# Patient Record
Sex: FEMALE | Race: WHITE | NOT HISPANIC OR LATINO | Employment: OTHER | URBAN - METROPOLITAN AREA
[De-identification: names, ages, dates, MRNs, and addresses within clinical notes are randomized per-mention and may not be internally consistent; named-entity substitution may affect disease eponyms.]

---

## 2017-03-31 ENCOUNTER — NEW REFERRAL (OUTPATIENT)
Dept: URBAN - METROPOLITAN AREA CLINIC 33 | Facility: CLINIC | Age: 77
End: 2017-03-31

## 2017-03-31 VITALS
SYSTOLIC BLOOD PRESSURE: 159 MMHG | HEART RATE: 80 BPM | BODY MASS INDEX: 27.82 KG/M2 | HEIGHT: 65 IN | DIASTOLIC BLOOD PRESSURE: 86 MMHG | WEIGHT: 167 LBS

## 2017-03-31 DIAGNOSIS — H02.833: ICD-10-CM

## 2017-03-31 DIAGNOSIS — Z98.890: ICD-10-CM

## 2017-03-31 DIAGNOSIS — H43.391: ICD-10-CM

## 2017-03-31 DIAGNOSIS — H35.373: ICD-10-CM

## 2017-03-31 DIAGNOSIS — H02.836: ICD-10-CM

## 2017-03-31 DIAGNOSIS — H04.123: ICD-10-CM

## 2017-03-31 PROCEDURE — 92134 CPTRZ OPH DX IMG PST SGM RTA: CPT

## 2017-03-31 PROCEDURE — 99204 OFFICE O/P NEW MOD 45 MIN: CPT

## 2017-03-31 PROCEDURE — 92225 OPHTHALMOSCOPY (INITIAL): CPT

## 2017-03-31 ASSESSMENT — VISUAL ACUITY
OS_SC: 20/50+2
OD_SC: 20/25

## 2017-03-31 ASSESSMENT — TONOMETRY
OD_IOP_MMHG: 15
OS_IOP_MMHG: 15

## 2017-10-27 ENCOUNTER — FOLLOW UP (OUTPATIENT)
Dept: URBAN - METROPOLITAN AREA CLINIC 33 | Facility: CLINIC | Age: 77
End: 2017-10-27

## 2017-10-27 VITALS — DIASTOLIC BLOOD PRESSURE: 82 MMHG | SYSTOLIC BLOOD PRESSURE: 159 MMHG | HEART RATE: 81 BPM | HEIGHT: 55 IN

## 2017-10-27 DIAGNOSIS — H35.373: ICD-10-CM

## 2017-10-27 DIAGNOSIS — H43.391: ICD-10-CM

## 2017-10-27 PROCEDURE — G8427 DOCREV CUR MEDS BY ELIG CLIN: HCPCS

## 2017-10-27 PROCEDURE — 92014 COMPRE OPH EXAM EST PT 1/>: CPT

## 2017-10-27 PROCEDURE — 92250 FUNDUS PHOTOGRAPHY W/I&R: CPT

## 2017-10-27 PROCEDURE — 1036F TOBACCO NON-USER: CPT

## 2017-10-27 ASSESSMENT — VISUAL ACUITY
OS_SC: 20/50+2
OD_SC: 20/30-2

## 2017-10-27 ASSESSMENT — TONOMETRY
OS_IOP_MMHG: 15
OD_IOP_MMHG: 17

## 2018-11-09 ENCOUNTER — FOLLOW UP (OUTPATIENT)
Dept: URBAN - METROPOLITAN AREA CLINIC 33 | Facility: CLINIC | Age: 78
End: 2018-11-09

## 2018-11-09 VITALS — BODY MASS INDEX: 28.13 KG/M2 | HEIGHT: 66 IN | WEIGHT: 175 LBS

## 2018-11-09 DIAGNOSIS — H35.373: ICD-10-CM

## 2018-11-09 DIAGNOSIS — H02.836: ICD-10-CM

## 2018-11-09 DIAGNOSIS — H43.391: ICD-10-CM

## 2018-11-09 DIAGNOSIS — H35.3131: ICD-10-CM

## 2018-11-09 DIAGNOSIS — Z98.890: ICD-10-CM

## 2018-11-09 DIAGNOSIS — H02.833: ICD-10-CM

## 2018-11-09 DIAGNOSIS — H04.123: ICD-10-CM

## 2018-11-09 PROCEDURE — 92250 FUNDUS PHOTOGRAPHY W/I&R: CPT

## 2018-11-09 PROCEDURE — 92134 CPTRZ OPH DX IMG PST SGM RTA: CPT

## 2018-11-09 PROCEDURE — 92014 COMPRE OPH EXAM EST PT 1/>: CPT

## 2018-11-09 ASSESSMENT — TONOMETRY
OD_IOP_MMHG: 14
OS_IOP_MMHG: 17

## 2018-11-09 ASSESSMENT — VISUAL ACUITY
OD_SC: 20/30-
OS_SC: 20/40-1

## 2019-12-20 ENCOUNTER — FOLLOW UP (OUTPATIENT)
Dept: URBAN - METROPOLITAN AREA CLINIC 33 | Facility: CLINIC | Age: 79
End: 2019-12-20

## 2019-12-20 VITALS — BODY MASS INDEX: 27.16 KG/M2 | HEIGHT: 65 IN | WEIGHT: 163 LBS

## 2019-12-20 DIAGNOSIS — H02.833: ICD-10-CM

## 2019-12-20 DIAGNOSIS — H35.373: ICD-10-CM

## 2019-12-20 DIAGNOSIS — Z98.890: ICD-10-CM

## 2019-12-20 DIAGNOSIS — H43.391: ICD-10-CM

## 2019-12-20 DIAGNOSIS — H04.123: ICD-10-CM

## 2019-12-20 DIAGNOSIS — H35.3131: ICD-10-CM

## 2019-12-20 DIAGNOSIS — H02.836: ICD-10-CM

## 2019-12-20 PROCEDURE — 92250 FUNDUS PHOTOGRAPHY W/I&R: CPT

## 2019-12-20 PROCEDURE — 92014 COMPRE OPH EXAM EST PT 1/>: CPT

## 2019-12-20 ASSESSMENT — VISUAL ACUITY
OS_SC: 20/50-
OD_SC: 20/30-

## 2019-12-20 ASSESSMENT — TONOMETRY
OS_IOP_MMHG: 14
OD_IOP_MMHG: 13

## 2020-12-11 ENCOUNTER — FOLLOW UP (OUTPATIENT)
Dept: URBAN - METROPOLITAN AREA CLINIC 33 | Facility: CLINIC | Age: 80
End: 2020-12-11

## 2020-12-11 VITALS — WEIGHT: 166 LBS | BODY MASS INDEX: 29.41 KG/M2 | HEIGHT: 63 IN

## 2020-12-11 DIAGNOSIS — H04.123: ICD-10-CM

## 2020-12-11 DIAGNOSIS — H35.373: ICD-10-CM

## 2020-12-11 DIAGNOSIS — H43.391: ICD-10-CM

## 2020-12-11 DIAGNOSIS — H02.833: ICD-10-CM

## 2020-12-11 DIAGNOSIS — H02.836: ICD-10-CM

## 2020-12-11 DIAGNOSIS — Z98.890: ICD-10-CM

## 2020-12-11 DIAGNOSIS — H35.3131: ICD-10-CM

## 2020-12-11 PROCEDURE — 92014 COMPRE OPH EXAM EST PT 1/>: CPT

## 2020-12-11 PROCEDURE — 92250 FUNDUS PHOTOGRAPHY W/I&R: CPT

## 2020-12-11 PROCEDURE — 92134 CPTRZ OPH DX IMG PST SGM RTA: CPT

## 2020-12-11 ASSESSMENT — VISUAL ACUITY
OD_SC: 20/40-2
OS_SC: 20/60-2

## 2020-12-11 ASSESSMENT — TONOMETRY
OD_IOP_MMHG: 14
OS_IOP_MMHG: 15

## 2022-05-05 ENCOUNTER — FOLLOW UP (OUTPATIENT)
Dept: URBAN - METROPOLITAN AREA CLINIC 33 | Facility: CLINIC | Age: 82
End: 2022-05-05

## 2022-05-05 DIAGNOSIS — H43.391: ICD-10-CM

## 2022-05-05 DIAGNOSIS — H04.123: ICD-10-CM

## 2022-05-05 DIAGNOSIS — H35.3131: ICD-10-CM

## 2022-05-05 DIAGNOSIS — H35.373: ICD-10-CM

## 2022-05-05 PROCEDURE — 92134 CPTRZ OPH DX IMG PST SGM RTA: CPT

## 2022-05-05 PROCEDURE — 92014 COMPRE OPH EXAM EST PT 1/>: CPT

## 2022-05-05 ASSESSMENT — TONOMETRY
OD_IOP_MMHG: 14
OS_IOP_MMHG: 14

## 2022-05-05 ASSESSMENT — VISUAL ACUITY
OD_SC: 20/25-1
OS_PH: 20/30+1
OS_SC: 20/50-1

## 2022-10-17 NOTE — PROCEDURE NOTE: CLINICAL
PROCEDURE NOTE: YAG Capsulotomy OS. Diagnosis: Posterior Capsular Opacity. Anesthesia: Topical. Prior to commencing surgery patient identification, surgical procedure, site, and side were confirmed by Dr. Rosio Cooley. Following topical proparacaine anesthesia, the patient was positioned at the YAG laser, a contact lens coupled to the cornea with methylcellulose and an axial posterior performed capsulotomy without complication using 2.8 Mj x 28. Excess methylcellulose was washed from the eye. One drop of Alphagan was instilled and the patient returned to the holding area having tolerated the procedure well and without complication. Alix Tolentino

## 2022-10-17 NOTE — PATIENT DISCUSSION
Posterior capsular opacity accounts for the patient's complaints and is interfering with activities of daily living. Discussed risks and benefits of YAG Laser Capsulotomy. Patient wishes to proceed.  YAG Laser Capsulotomy done in the left eye today.

## 2022-10-27 NOTE — PROCEDURE NOTE: CLINICAL
PROCEDURE NOTE: YAG Capsulotomy OD. Diagnosis: Posterior Capsular Opacity. Anesthesia: Topical. Prior to commencing surgery patient identification, surgical procedure, site, and side were confirmed by Dr. Grant Mccabe. Following topical proparacaine anesthesia, the patient was positioned at the YAG laser, a contact lens coupled to the cornea with methylcellulose and an axial posterior performed capsulotomy without complication using 2.6 Mj x 20. Excess methylcellulose was washed from the eye. One drop of Alphagan was instilled and the patient returned to the holding area having tolerated the procedure well and without complication. Pilar Quiros

## 2023-05-11 ENCOUNTER — FOLLOW UP (OUTPATIENT)
Dept: URBAN - METROPOLITAN AREA CLINIC 33 | Facility: CLINIC | Age: 83
End: 2023-05-11

## 2023-05-11 VITALS — BODY MASS INDEX: 27.32 KG/M2 | HEIGHT: 65 IN | WEIGHT: 164 LBS

## 2023-05-11 DIAGNOSIS — H04.123: ICD-10-CM

## 2023-05-11 DIAGNOSIS — H35.373: ICD-10-CM

## 2023-05-11 DIAGNOSIS — H43.391: ICD-10-CM

## 2023-05-11 DIAGNOSIS — H35.3131: ICD-10-CM

## 2023-05-11 PROCEDURE — 92250 FUNDUS PHOTOGRAPHY W/I&R: CPT

## 2023-05-11 PROCEDURE — 92134 CPTRZ OPH DX IMG PST SGM RTA: CPT

## 2023-05-11 PROCEDURE — 92014 COMPRE OPH EXAM EST PT 1/>: CPT

## 2023-05-11 ASSESSMENT — VISUAL ACUITY
OS_PH: 20/50-2
OD_PH: 20/40
OS_SC: 20/60-2
OD_SC: 20/50+2

## 2023-05-11 ASSESSMENT — TONOMETRY
OD_IOP_MMHG: 10
OS_IOP_MMHG: 14

## 2024-06-14 ENCOUNTER — COMPREHENSIVE EXAM (OUTPATIENT)
Dept: URBAN - METROPOLITAN AREA CLINIC 33 | Facility: CLINIC | Age: 84
End: 2024-06-14

## 2024-06-14 VITALS — BODY MASS INDEX: 25.83 KG/M2 | HEIGHT: 65 IN | WEIGHT: 155 LBS

## 2024-06-14 DIAGNOSIS — H43.391: ICD-10-CM

## 2024-06-14 DIAGNOSIS — H35.373: ICD-10-CM

## 2024-06-14 DIAGNOSIS — H02.831: ICD-10-CM

## 2024-06-14 DIAGNOSIS — Z98.890: ICD-10-CM

## 2024-06-14 DIAGNOSIS — H04.123: ICD-10-CM

## 2024-06-14 DIAGNOSIS — H02.834: ICD-10-CM

## 2024-06-14 DIAGNOSIS — H35.3131: ICD-10-CM

## 2024-06-14 PROCEDURE — 92250 FUNDUS PHOTOGRAPHY W/I&R: CPT | Mod: 59

## 2024-06-14 PROCEDURE — 92014 COMPRE OPH EXAM EST PT 1/>: CPT

## 2024-06-14 PROCEDURE — 92134 CPTRZ OPH DX IMG PST SGM RTA: CPT

## 2024-06-14 ASSESSMENT — VISUAL ACUITY
OD_SC: 20/30+2
OS_SC: 20/60
OS_PH: 20/50-2

## 2024-06-14 ASSESSMENT — TONOMETRY
OD_IOP_MMHG: 17
OS_IOP_MMHG: 15

## 2025-07-03 ENCOUNTER — FOLLOW UP (OUTPATIENT)
Age: 85
End: 2025-07-03

## 2025-07-03 VITALS — BODY MASS INDEX: 24.99 KG/M2 | HEIGHT: 65 IN | WEIGHT: 150 LBS

## 2025-07-03 DIAGNOSIS — H43.391: ICD-10-CM

## 2025-07-03 DIAGNOSIS — H02.831: ICD-10-CM

## 2025-07-03 DIAGNOSIS — Z98.890: ICD-10-CM

## 2025-07-03 DIAGNOSIS — H35.3131: ICD-10-CM

## 2025-07-03 DIAGNOSIS — H04.123: ICD-10-CM

## 2025-07-03 DIAGNOSIS — H02.834: ICD-10-CM

## 2025-07-03 DIAGNOSIS — H35.373: ICD-10-CM

## 2025-07-03 PROCEDURE — 92014 COMPRE OPH EXAM EST PT 1/>: CPT

## 2025-07-03 PROCEDURE — 92250 FUNDUS PHOTOGRAPHY W/I&R: CPT | Mod: 59

## 2025-07-03 PROCEDURE — 92134 CPTRZ OPH DX IMG PST SGM RTA: CPT
